# Patient Record
(demographics unavailable — no encounter records)

---

## 2025-05-22 NOTE — CONSULT LETTER
[Dear  ___] : Dear  [unfilled], [Consult Letter:] : I had the pleasure of evaluating your patient, [unfilled]. [Please see my note below.] : Please see my note below. [Consult Closing:] : Thank you very much for allowing me to participate in the care of this patient.  If you have any questions, please do not hesitate to contact me. [Sincerely,] : Sincerely, [FreeTextEntry2] : CHRISTOPHER KOWALSKI MD [FreeTextEntry3] : Estiven Anglin Professor of Pediatrics Pediatrics [facility] [place]/Rheumatology [address] [place] [address] Tel: [phone]

## 2025-05-22 NOTE — HISTORY OF PRESENT ILLNESS
[FreeTextEntry1] : Beginning in the cold weather this year, Dee noted that her fingers were hurting Describes increasing pain in her fingers and now her wrists over the winter Now pain is daily fingers are stiff for 5-10 minutes Not sure if there is any swelling Has trouble playing the violin due to then pain typing an issue as is writing - holding her pen Sometimes difficult to open a bottle Has taken ibuprofen sometimes takes tylenol both of which offer temporary relief Tried icy hot as well Also has noted pain in elbow and knees. Also very stiff if sitting for too long  SYSTEMS REVIEW No rash, no fever No abdominall pains No mucositis

## 2025-05-22 NOTE — REVIEW OF SYSTEMS
[Menarche] : ~T menarche [Joint Pains] : arthralgias [Joint Swelling] : joint swelling  [Back Pain] : ~T back pain [AM Stiffness] : am stiffness [Seasonal Allergies] : seasonal allergies [Fever] : no fever [Rash] : no rash [Eye Pain] : no eye pain [Redness] : no redness [Blurry Vision] : no blurred vision [Change in Vision] : no change in vision  [Sore Throat] : no sore throat [Earache] : no earache [Nosebleeds] : no epistaxis [Oral Ulcers] : no oral ulcers [Chest Pain] : no chest pain or discomfort [Cough] : no cough [Shortness of Breath] : no shortness of breath [Diarrhea] : no diarrhea [Abdominal Pain] : no abdominal pain [Constipation] : no constipation [Irregular Periods] : no irregular periods [Cold Intolerance] : cold tolerant [Bruising] : no tendency for easy bruising [Swollen Glands] : no lymphadenopathy [Smokers in Home] : no one in home smokes

## 2025-05-22 NOTE — CONSULT LETTER
[Dear  ___] : Dear  [unfilled], [Consult Letter:] : I had the pleasure of evaluating your patient, [unfilled]. [Please see my note below.] : Please see my note below. [Consult Closing:] : Thank you very much for allowing me to participate in the care of this patient.  If you have any questions, please do not hesitate to contact me. [Sincerely,] : Sincerely, [FreeTextEntry2] : CHRISTOPHER KOWALSKI MD [FreeTextEntry3] : Estiven Anglin Professor of Pediatrics Pediatrics [facility] [place]/Rheumatology [address] [employer] [address] [place] [address] Tel: [phone]

## 2025-05-22 NOTE — PHYSICAL EXAM
[PERRLA] : JORDAN [S1, S2 Present] : S1, S2 present [Clear to auscultation] : clear to auscultation [Soft] : soft [NonTender] : non tender [Non Distended] : non distended [Normal Bowel Sounds] : normal bowel sounds [No Hepatosplenomegaly] : no hepatosplenomegaly [No Abnormal Lymph Nodes Palpated] : no abnormal lymph nodes palpated [Range Of Motion] : full range of motion [Intact Judgement] : intact judgement  [Insight Insight] : intact insight [_______] : Knee: [unfilled] [Acute distress] : no acute distress [Erythematous Conjunctiva] : nonerythematous conjunctiva [Erythematous Oropharynx] : nonerythematous oropharynx [Lesions] : no lesions [Murmurs] : no murmurs [Joint effusions] : no joint effusions